# Patient Record
Sex: FEMALE | Race: WHITE | NOT HISPANIC OR LATINO | URBAN - METROPOLITAN AREA
[De-identification: names, ages, dates, MRNs, and addresses within clinical notes are randomized per-mention and may not be internally consistent; named-entity substitution may affect disease eponyms.]

---

## 2024-07-19 ENCOUNTER — OFFICE VISIT (OUTPATIENT)
Dept: OBSTETRICS AND GYNECOLOGY | Age: 34
End: 2024-07-19
Payer: MEDICAID

## 2024-07-19 VITALS
WEIGHT: 147.6 LBS | HEIGHT: 67 IN | SYSTOLIC BLOOD PRESSURE: 112 MMHG | BODY MASS INDEX: 23.17 KG/M2 | DIASTOLIC BLOOD PRESSURE: 60 MMHG

## 2024-07-19 DIAGNOSIS — Z01.419 WELL WOMAN EXAM WITH ROUTINE GYNECOLOGICAL EXAM: Primary | ICD-10-CM

## 2024-07-19 DIAGNOSIS — Z12.4 SCREENING FOR CERVICAL CANCER: ICD-10-CM

## 2024-07-19 DIAGNOSIS — Z76.89 ENCOUNTER TO ESTABLISH CARE: ICD-10-CM

## 2024-07-19 DIAGNOSIS — Z82.79 FHX: DOWN SYNDROME: ICD-10-CM

## 2024-07-24 PROBLEM — Z82.79 FHX: DOWN SYNDROME: Status: ACTIVE | Noted: 2024-07-24

## 2024-07-24 NOTE — PROGRESS NOTES
[facility]   Obstetrics and Gynecology   Routine Annual Visit    2024    Patient: Teresa Jackson          MR#:[id number]    History of Present Illness    Chief Complaint   Patient presents with    Annual Exam    Establish Care     NGYN - Establish care and family planning consult, Last pap 2023 nml       34 y.o. female  who presents for annual exam.  She is currently taking OCPs but is ready to start trying to conceive with her partner.  She started prenatal vitamins 2 weeks ago.    Of note, her 's sister has Down syndrome.  No one else in the family does.    Obstetric History:  OB History          0    Para   0    Term   0       0    AB   0    Living   0         SAB   0    IAB   0    Ectopic   0    Molar   0    Multiple   0    Live Births   0               Menstrual History:     Patient's last menstrual period was 2024 (exact date).       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  Very happy here.    ________________________________________  Patient Active Problem List   Diagnosis    FHx: Down syndrome     Past Medical History:   Diagnosis Date    No pertinent past medical history      Past Surgical History:   Procedure Laterality Date    WISDOM TOOTH EXTRACTION       Social History     Tobacco Use   Smoking Status Never    Passive exposure: Never   Smokeless Tobacco Never     Family History   Problem Relation Age of Onset    Hypertension Father     No Known Problems Mother     Colon cancer Paternal Grandfather     Breast cancer Neg Hx     Ovarian cancer Neg Hx     Uterine cancer Neg Hx      Prior to Admission medications    Medication Sig Start Date End Date Taking? Authorizing Provider   drospirenone-ethinyl estradiol (LISE,OCELLA) 3-0.03 MG per tablet Take 1 tablet by mouth Daily. 23  Yes  "Provider, Historical, MD     ________________________________________    Current contraception: OCP (estrogen/progesterone)  History of abnormal Pap smear: no  Family history of uterine or ovarian cancer: no  Family History of colon cancer/colon polyps: no  History of abnormal mammogram: no    The following portions of the patient's history were reviewed and updated as appropriate: allergies, current medications, past family history, past medical history, past social history, past surgical history, and problem list.    Review of Systems   All other systems reviewed and are negative.           Objective     /60   Ht 170.2 cm (67\")   Wt 67 kg (147 lb 9.6 oz)   LMP 06/24/2024 (Exact Date)   Breastfeeding No   BMI 23.12 kg/m²    BP Readings from Last 3 Encounters:   07/19/24 112/60   01/06/24 125/87      Wt Readings from Last 3 Encounters:   07/19/24 67 kg (147 lb 9.6 oz)   01/06/24 65.8 kg (145 lb)        BMI: Estimated body mass index is 23.12 kg/m² as calculated from the following:    Height as of this encounter: 170.2 cm (67\").    Weight as of this encounter: 67 kg (147 lb 9.6 oz).    Physical Exam  Vitals and nursing note reviewed.   Constitutional:       General: She is not in acute distress.     Appearance: Normal appearance.   HENT:      Head: Normocephalic and atraumatic.   Eyes:      Extraocular Movements: Extraocular movements intact.   Cardiovascular:      Rate and Rhythm: Normal rate and regular rhythm.      Pulses: Normal pulses.      Heart sounds: No murmur heard.  Pulmonary:      Effort: Pulmonary effort is normal. No respiratory distress.      Breath sounds: Normal breath sounds.   Chest:   Breasts:     Right: Normal. No mass, nipple discharge, skin change or tenderness.      Left: Normal. No mass, nipple discharge, skin change or tenderness.   Abdominal:      General: There is no distension.      Palpations: Abdomen is soft. There is no mass.      Tenderness: There is no abdominal " tenderness.   Genitourinary:     General: Normal vulva.      Labia:         Right: No rash or lesion.         Left: No rash or lesion.       Urethra: No prolapse, urethral swelling or urethral lesion.      Vagina: Normal.      Cervix: Normal.      Uterus: Normal.       Adnexa: Right adnexa normal and left adnexa normal.      Comments: Bladder: no masses or tenderness  Perineum/Anus: no masses, lesions, or skin changes  Musculoskeletal:         General: No swelling. Normal range of motion.      Cervical back: Normal range of motion.   Lymphadenopathy:      Upper Body:      Right upper body: No axillary adenopathy.      Left upper body: No axillary adenopathy.   Skin:     General: Skin is warm and dry.   Neurological:      General: No focal deficit present.      Mental Status: She is alert and oriented to person, place, and time.   Psychiatric:         Mood and Affect: Mood normal.         Behavior: Behavior normal.         As part of wellness and prevention, the following topics were discussed with the patient:  Encouraged self breast exam  Physical activity and regular exercised encouraged.   Injury prevention discussed.  Healthy weight discussed.  Nutrition discussed.  Substance abuse/misuse discussed.  Sexual behavior/safe practices discussed.   Sexual transmitted disease prevention   Contraception discussed.   Mental health discussed.           Assessment:  Diagnoses and all orders for this visit:    1. Well woman exam with routine gynecological exam (Primary)  -     IGP, Apt HPV,rfx 16 / 18,45    2. Encounter to establish care    3. Screening for cervical cancer  -     IGP, Apt HPV,rfx 16 / 18,45    4. FHx: Down syndrome  Overview:  's sister has T21        - Breast and pelvic exam normal  - Pap today  - Declined STD screen  - Discussed preconception counseling.  Continue prenatal vitamins.  Discussed that the vast majority of Down syndrome is a result of random mutation but there is a rare form that can  be familial.  Your  is welcome to pursue a karyotype if interested.  Declines for now.    Plan:  Return in about 1 year (around 7/19/2025), or if symptoms worsen or fail to improve, for Annual.      Guillermina Carrillo MD  7/24/2024 11:29 EDT

## 2024-07-25 LAB
CYTOLOGIST CVX/VAG CYTO: NORMAL
CYTOLOGY CVX/VAG DOC CYTO: NORMAL
CYTOLOGY CVX/VAG DOC THIN PREP: NORMAL
DX ICD CODE: NORMAL
HPV I/H RISK 4 DNA CVX QL PROBE+SIG AMP: NEGATIVE
Lab: NORMAL
OTHER STN SPEC: NORMAL
STAT OF ADQ CVX/VAG CYTO-IMP: NORMAL

## 2024-07-26 NOTE — PROGRESS NOTES
Please call patient: Pap smear is normal and HPV negative. I recommend repeating in 5 years.  Thank you!    Guillermina Carrillo MD  9/29/2021  10:14 EDT

## 2025-03-28 PROCEDURE — 88305 TISSUE EXAM BY PATHOLOGIST: CPT | Performed by: OBSTETRICS & GYNECOLOGY

## 2025-03-31 ENCOUNTER — LAB REQUISITION (OUTPATIENT)
Dept: LAB | Facility: HOSPITAL | Age: 35
End: 2025-03-31

## 2025-03-31 DIAGNOSIS — O02.1 MISSED ABORTION: ICD-10-CM

## 2025-04-01 LAB
CYTO UR: NORMAL
LAB AP CASE REPORT: NORMAL
LAB AP CLINICAL INFORMATION: NORMAL
PATH REPORT.FINAL DX SPEC: NORMAL
PATH REPORT.GROSS SPEC: NORMAL